# Patient Record
(demographics unavailable — no encounter records)

---

## 2024-10-21 NOTE — DISCUSSION/SUMMARY
[FreeTextEntry1] : Discussed the possibility of strep throat as well as COVID infection.  Rapid COVID test is negative in office.  Rapid strep negative in office. Throat culture sent to the lab. If throat culture positive at lab will call to start antibiotics. Call immediately if any worsening of signs or symptoms. Parent understands the plan.

## 2024-11-07 NOTE — HISTORY OF PRESENT ILLNESS
[FreeTextEntry6] : 10 y/o BIB mother due to sore throat since yesterday. Parent reports pt is prone to strep, no known exposure. Afebrile. + Congestion noted. Cough noted. Denies respiratory distress, no stridor or wheeze noted. Tolerating PO but decreased intake, + nausea, normal BMs, good UOP. Also w/complaints of headache and abdominal discomfort. Denies myalgias.

## 2024-11-07 NOTE — PHYSICAL EXAM
[Erythematous Oropharynx] : erythematous oropharynx [Enlarged Tonsils] : enlarged tonsils [Enlarged] : enlarged [Submental] : submental [Anterior Cervical] : anterior cervical [NL] : warm, clear [Exudate] : no exudate

## 2024-11-07 NOTE — DISCUSSION/SUMMARY
[FreeTextEntry1] : Rapid strep negative, symptoms likely due to viral syndrome. Pt not demonstrating any distress and is well hydrated.  F/u throat culture and treat accordingly Supportive care: cold drinks for throat pain, Motrin/Tylenol for pain or fever, increase hydration, humidified air for cough, nasal saline for congestion Appropriate anticipatory guidance and education given; seek care if symptoms persist or worsen. D/w parent seek care if unable to tolerate PO or decreased UOP.

## 2024-11-07 NOTE — REVIEW OF SYSTEMS
[Headache] : headache [Nasal Congestion] : nasal congestion [Sore Throat] : sore throat [Cough] : cough [Appetite Changes] : appetite changes [Abdominal Pain] : abdominal pain [Negative] : Genitourinary

## 2024-12-12 NOTE — HISTORY OF PRESENT ILLNESS
[de-identified] : Sore throat [FreeTextEntry6] : Patient is an 11-year-old female brought to office by dad for sore throat for 2 to 3 days.  Patient vomited 2 times yesterday.  No diarrhea.  Patient felt very warm temperature was not taken.  Patient has a history of strep throat in the past.  Patient states her throat feels like strep

## 2024-12-12 NOTE — DISCUSSION/SUMMARY
[FreeTextEntry1] : Discussed viral illnesses and strep throat at length with father.  Rapid strep negative in office. Throat culture sent to the lab. If throat culture positive at lab will call to start antibiotics. Call immediately if any worsening of signs or symptoms. Parent understands the plan.

## 2025-01-08 NOTE — PHYSICAL EXAM
[FreeTextEntry3] : umbilicated tan papules;  Right antecubital fossa, with inflammatory changes; multiple on abdomen; right popliteal fossa; with one inflamed tender nodule

## 2025-01-08 NOTE — ASSESSMENT
[FreeTextEntry1] : Molluscum; + inflammatory changes, likely signals beginning of resolution, although still with many lesions May use OTC hydrocortisone ointment prn for itching;   Therapeutic options and their risks and benefits; along with multiple diagnostic possibilities were discussed at length; risks and benefits of further study were discussed;  right popliteal more consistent with insect bite, likely from [place] trip  Expect resolution by summer 2025;  f/u if fails to improve

## 2025-01-08 NOTE — HISTORY OF PRESENT ILLNESS
[de-identified] : Pt. with molluscum, present approx one year; c/o exacerbation of multiple lesions over last few weeks, concerned with possible infection worsened over holiday trip to [place]

## 2025-03-13 NOTE — PHYSICAL EXAM
[Alert] : alert [Oriented x 3] : ~L oriented x 3 [Well Nourished] : well nourished [FreeTextEntry3] : Molluscum papules, abdomen, right flank, bilateral UE's - total ~30.